# Patient Record
Sex: FEMALE | Race: WHITE
[De-identification: names, ages, dates, MRNs, and addresses within clinical notes are randomized per-mention and may not be internally consistent; named-entity substitution may affect disease eponyms.]

---

## 2017-03-22 NOTE — RAD
PROCEDURE 

CT head without contrast. 

 

HISTORY 

Increasing right-sided weakness for 2 weeks. 

 

TECHNIQUE 

Noncontrast CT head was obtained. 

One or more of the following individualized dose reduction techniques were

utilized for this exam: 1. Automated exposure control. 2. Adjustment of 

the mA and/or kV according to patient's size. 3. Use of iterative 

reconstruction technique. 

 

COMPARISON 

January 12, 2017. 

 

FINDINGS 

Large stable infarct involving the left frontal lobe and left basal 

ganglia and left insula is again noted. This also likely extends into the 

thalamus. It has similar appearance to prior. No new infarct is 

identified. There is no acute intracranial hemorrhage or extra-axial fluid

collection. There is brain parenchymal volume loss with ex vacuo dilation 

of the left lateral ventricle. There is no mass effect or midline shift. 

There are vascular calcifications. The paranasal sinuses and mastoid air 

cells are clear. 

 

IMPRESSION 

Stable large infarct mainly involving the frontal lobe and basal ganglia. 

No evidence of an acute infarct. 

 

Electronically signed by: Jose Christine MD (Mar 22, 2017 18:41:45)

## 2017-03-23 NOTE — RAD
Indication headaches. Right arm pain. Possible CVA. Protocol study.



PA and lateral views of the chest were obtained. Comparison is made to a study

January 10, 2017.



Postoperative changes are noted as well as a bipolar cardiac pacing device.

There is no congestive heart failure. A consolidated pneumonia is not seen.

There is slight blunting of the left costophrenic angle likely reflecting

scar. Significant pleural fluid in either lung is not seen.



IMPRESSION: No acute or focal process seen in the chest

## 2017-03-23 NOTE — EKG
[facility]

               [address] [place] [address]

Test Date:    2017               Test Time:    07:42:40

Pat Name:     RICARDA HOPE         Department:   

Patient ID:   [id number]           Room:         648 1

Gender:       F                        Technician:   LILY

:          1950               Requested By: GARTH GONSALEZ

Order Number: [id number]            Reading MD:     

                                 Measurements

Intervals                              Axis          

Rate:         61                       P:            0

KY:           216                      QRS:          45

QRSD:         74                       T:            21

QT:           396                                    

QTc:          400                                    

                           Interpretive Statements

SINUS RHYTHM

NO SPECIFIC ECG ABNORMALITIES

RI6.01

Compared to ECG 2012 03:30:53

No significant changes

## 2017-03-23 NOTE — PDOC2
CONSULT


Date of Consult


Date of Consult


DATE: 3/23/17 


TIME: 17:06





Reason for Consult


Reason for Consult:


Right carotid stenosis, asymptomatic with left internal carotid occlusion.





Referring Physician


Referring Physician:


Dr. Mejia





Identification/Chief Complaint


Chief Complaint


Right arm weakness





Source


Source:  Patient





History of Present Illness


Reason for Visit:





Ms. Castillo is a 66 y/o female with HTN, HLD, CAD s/p CABG, Pacemaker s/p 

right-sided (with residual RUE weakness) stroke 6 years ago w/ L ICA occlusion 

that was admitted to [facility] for evaluation of right arm weakness.  She states that 

the right arm has been weaker over the past several weeks, but she did have 

residual weakness after of the arm after the stroke.  She denies any chest 

pain. She denies any claudication, rest pain, or ulcers of the legs.





Past Medical History


Cardiovascular:  CAD, Hyperlipidemia


Pulmonary:  COPD


CENTRAL NERVOUS SYSTEM:  CVA


GI:  GERD


Heme/Onc:  Other (hereditary spherocytosis)





Past Surgical History


Past Surgical History:  Pacemaker, CABG, Other (splenectomy for the 

spherocytosis, coronary stent)





Current Problem List


Problem List


 Problems


Medical Problems:


(1) Carotid artery occlusion


Status: Acute  











Current Medications


Current Medications





 Current Medications


Acetaminophen (Tylenol) 650 mg 1X  ONCE PO  Last administered on 3/22/17at 18:02

;  Start 3/22/17 at 17:45;  Stop 3/22/17 at 17:49;  Status DC


Ibuprofen (Motrin) 600 mg 1X  ONCE PO  Last administered on 3/22/17at 18:02;  

Start 3/22/17 at 17:45;  Stop 3/22/17 at 17:49;  Status DC


Metoprolol Tartrate (Lopressor) 50 mg TID PO  Last administered on 3/23/17at 13:

32;  Start 3/22/17 at 21:00


Simvastatin (Zocor) 40 mg QHS PO ;  Start 3/22/17 at 21:00


Lisinopril (Prinivil) 20 mg DAILY PO  Last administered on 3/23/17at 09:04;  

Start 3/23/17 at 09:00


Tizanidine HCl (Zanaflex) 4 mg QHS PO ;  Start 3/22/17 at 21:00;  Stop 3/22/17 

at 21:15;  Status DC


Hydrochlorothiazide (Microzide) 12.5 mg DAILY PO  Last administered on 3/23/

17at 09:05;  Start 3/23/17 at 09:00


Aspirin (Children'S Aspirin) 81 mg DAILYWBKFT PO ;  Start 3/23/17 at 08:00;  

Stop 3/23/17 at 08:59;  Status DC


Acetaminophen (Tylenol) 650 mg PRN Q6HRS  PRN PO MILD PAIN / TEMP Last 

administered on 3/23/17at 11:14;  Start 3/22/17 at 20:45


Ibuprofen (Motrin) 400 mg PRN Q6HRS  PRN PO INFLAMMATION;  Start 3/22/17 at 20:

45


Tizanidine HCl (Zanaflex) 2 mg QHS PO ;  Start 3/22/17 at 21:15


Aspirin (Children'S Aspirin) 324 mg DAILYWBKFT PO  Last administered on 3/23/

17at 09:04;  Start 3/23/17 at 09:00


Fluoxetine HCl (Prozac) 20 mg DAILY PO  Last administered on 3/23/17at 13:32;  

Start 3/23/17 at 12:00





Active Scripts


Active


Simvastatin 40 Mg Tablet 1 Tab PO QHS


Tizanidine Hcl 4 Mg Tablet 0.5 Tab PO QHS


Reported


Lisinopril-Hctz 20-12.5 Mg Tab (Lisinopril/Hydrochlorothiazide) 1 Each Tablet 1 

Tab PO DAILY


Metoprolol Tartrate 50 Mg Tablet 50 Mg PO TID





Allergies


Allergies:  


Coded Allergies:  


     Penicillins (Verified  Allergy, Intermediate, 1/10/17)


     Sulfa (Sulfonamide Antibiotics) (Verified  Allergy, Intermediate, 1/10/17)


     codeine (Verified  Allergy, Intermediate, 1/10/17)


     erythromycin base (Verified  Allergy, Unknown, 3/22/17)





Physical Exam


General:  Alert, Oriented X3


HEENT:  Atraumatic, PERRLA


Heart:  Regular rate, Normal S1, Normal S2, Other (palpable radial b/l, 

palpable femoral b/l, palpable popliteal b/l, 1+ pulse DP/PT b/l)


Abdomen:  Normal bowel sounds, Soft, No tenderness


Extremities:  No cyanosis, No edema


Skin:  No rashes, No breakdown


Neuro:  Normal gait, Sensation intact, Other (RUE 4/5, LUE 5/5, lower 

extremities 5/5 on strength)


Psych/Mental Status:  Mental status NL, Mood NL


MUSCULOSKELETAL:  No joint tenderness, No swelling





Vitals


VITALS





 Vital Signs








  Date Time  Temp Pulse Resp B/P Pulse Ox O2 Delivery O2 Flow Rate FiO2


 


3/23/17 15:00 97.7 60 17 100/59 95 Room Air  





 97.7       











Labs


Labs





Laboratory Tests








Test


  3/22/17


21:25 3/23/17


04:25


 


Sodium Level


  140mmol/L


(136-145) 


 


 


Potassium Level


  3.7mmol/L


(3.5-5.1) 


 


 


Chloride Level


  105mmol/L


() 


 


 


Carbon Dioxide Level


  26mmol/L


(21-32) 


 


 


Anion Gap 9 (6-14)  


 


Blood Urea Nitrogen 35mg/dL (7-20)  


 


Creatinine


  2.1mg/dL


(0.6-1.0) 


 


 


Estimated GFR


(Cockcroft-Gault) 23.4 


  


 


 


BUN/Creatinine Ratio 17 (6-20)  


 


Glucose Level


  93mg/dL


(70-99) 


 


 


Calcium Level


  8.8mg/dL


(8.5-10.1) 


 


 


Total Bilirubin


  0.5mg/dL


(0.2-1.0) 


 


 


Aspartate Amino Transf


(AST/SGOT) 14U/L (15-37) 


  


 


 


Alanine Aminotransferase


(ALT/SGPT) 16U/L (14-59) 


  


 


 


Alkaline Phosphatase 51U/L ()  


 


Total Protein


  6.3g/dL


(6.4-8.2) 


 


 


Albumin


  3.3g/dL


(3.4-5.0) 


 


 


Albumin/Globulin Ratio 1.1 (1.0-1.7)  


 


White Blood Count


  


  11.0x10^3/uL


(4.0-11.0)


 


Red Blood Count


  


  3.94x10^6/uL


(3.50-5.40)


 


Hemoglobin


  


  13.4g/dL


(12.0-15.5)


 


Hematocrit


  


  36.6%


(36.0-47.0)


 


Mean Corpuscular Volume  93fL () 


 


Mean Corpuscular Hemoglobin  34pg (25-35) 


 


Mean Corpuscular Hemoglobin


Concent 


  37g/dL (31-37) 


 


 


Red Cell Distribution Width


  


  14.5%


(11.5-14.5)


 


Platelet Count


  


  216x10^3/uL


(140-400)


 


Neutrophils (%) (Auto)  22% (31-73) 


 


Lymphocytes (%) (Auto)  60% (24-48) 


 


Monocytes (%) (Auto)  8% (0-9) 


 


Eosinophils (%) (Auto)  9% (0-3) 


 


Basophils (%) (Auto)  1% (0-3) 


 


Neutrophils # (Auto)


  


  2.4x10^3uL


(1.8-7.7)


 


Lymphocytes # (Auto)


  


  6.5x10^3/uL


(1.0-4.8)


 


Monocytes # (Auto)


  


  0.9x10^3/uL


(0.0-1.1)


 


Eosinophils # (Auto)


  


  1.0x10^3/uL


(0.0-0.7)


 


Basophils # (Auto)


  


  0.1x10^3/uL


(0.0-0.2)


 


Segmented Neutrophils %  26% (35-66) 


 


Band Neutrophils %  1% (0-9) 


 


Lymphocytes %  51% (24-48) 


 


Atypical Lymphocytes %


(Manual) 


  6% (0-0) 


 


 


Monocytes %  7% (0-10) 


 


Eosinophils %  8% (0-5) 


 


Basophils %  1% (0-3) 


 


Platelet Estimate


  


  Adequate


(ADEQUATE)


 


Erythrocyte Sedimentation Rate  10 (0-25) 


 


Triglycerides Level


  


  236mg/dL


(0-150)


 


Cholesterol Level


  


  265mg/dL


(0-200)


 


LDL Cholesterol, Calculated


  


  183mg/dL


(0-100)


 


VLDL Cholesterol, Calculated  47mg/dL (0-40) 


 


HDL Cholesterol


  


  35mg/dL


(40-60)


 


Cholesterol/HDL Ratio  7.6 


 


Thyroid Stimulating Hormone


(TSH) 


  1.915uIU/mL


(0.358-3.74)








Laboratory Tests








Test


  3/22/17


21:25 3/23/17


04:25


 


Sodium Level


  140mmol/L


(136-145) 


 


 


Potassium Level


  3.7mmol/L


(3.5-5.1) 


 


 


Chloride Level


  105mmol/L


() 


 


 


Carbon Dioxide Level


  26mmol/L


(21-32) 


 


 


Anion Gap 9 (6-14)  


 


Blood Urea Nitrogen 35mg/dL (7-20)  


 


Creatinine


  2.1mg/dL


(0.6-1.0) 


 


 


Estimated GFR


(Cockcroft-Gault) 23.4 


  


 


 


BUN/Creatinine Ratio 17 (6-20)  


 


Glucose Level


  93mg/dL


(70-99) 


 


 


Calcium Level


  8.8mg/dL


(8.5-10.1) 


 


 


Total Bilirubin


  0.5mg/dL


(0.2-1.0) 


 


 


Aspartate Amino Transf


(AST/SGOT) 14U/L (15-37) 


  


 


 


Alanine Aminotransferase


(ALT/SGPT) 16U/L (14-59) 


  


 


 


Alkaline Phosphatase 51U/L ()  


 


Total Protein


  6.3g/dL


(6.4-8.2) 


 


 


Albumin


  3.3g/dL


(3.4-5.0) 


 


 


Albumin/Globulin Ratio 1.1 (1.0-1.7)  


 


White Blood Count


  


  11.0x10^3/uL


(4.0-11.0)


 


Red Blood Count


  


  3.94x10^6/uL


(3.50-5.40)


 


Hemoglobin


  


  13.4g/dL


(12.0-15.5)


 


Hematocrit


  


  36.6%


(36.0-47.0)


 


Mean Corpuscular Volume  93fL () 


 


Mean Corpuscular Hemoglobin  34pg (25-35) 


 


Mean Corpuscular Hemoglobin


Concent 


  37g/dL (31-37) 


 


 


Red Cell Distribution Width


  


  14.5%


(11.5-14.5)


 


Platelet Count


  


  216x10^3/uL


(140-400)


 


Neutrophils (%) (Auto)  22% (31-73) 


 


Lymphocytes (%) (Auto)  60% (24-48) 


 


Monocytes (%) (Auto)  8% (0-9) 


 


Eosinophils (%) (Auto)  9% (0-3) 


 


Basophils (%) (Auto)  1% (0-3) 


 


Neutrophils # (Auto)


  


  2.4x10^3uL


(1.8-7.7)


 


Lymphocytes # (Auto)


  


  6.5x10^3/uL


(1.0-4.8)


 


Monocytes # (Auto)


  


  0.9x10^3/uL


(0.0-1.1)


 


Eosinophils # (Auto)


  


  1.0x10^3/uL


(0.0-0.7)


 


Basophils # (Auto)


  


  0.1x10^3/uL


(0.0-0.2)


 


Segmented Neutrophils %  26% (35-66) 


 


Band Neutrophils %  1% (0-9) 


 


Lymphocytes %  51% (24-48) 


 


Atypical Lymphocytes %


(Manual) 


  6% (0-0) 


 


 


Monocytes %  7% (0-10) 


 


Eosinophils %  8% (0-5) 


 


Basophils %  1% (0-3) 


 


Platelet Estimate


  


  Adequate


(ADEQUATE)


 


Erythrocyte Sedimentation Rate  10 (0-25) 


 


Triglycerides Level


  


  236mg/dL


(0-150)


 


Cholesterol Level


  


  265mg/dL


(0-200)


 


LDL Cholesterol, Calculated


  


  183mg/dL


(0-100)


 


VLDL Cholesterol, Calculated  47mg/dL (0-40) 


 


HDL Cholesterol


  


  35mg/dL


(40-60)


 


Cholesterol/HDL Ratio  7.6 


 


Thyroid Stimulating Hormone


(TSH) 


  1.915uIU/mL


(0.358-3.74)











Images


Images


Carotid US 3/23/17


IMPRESSION: Moderately extensive intimal thickening and plaquing involving


both carotid bifurcations. No flow seen in the left internal carotid, just


after the bifurcation, similar to the previous exam suggesting occlusion.


Hemodynamically significant stenosis involving the right internal carotid 

estimated at approximately 70%.





CT Head, noncontrast 3/23/17





IMPRESSION 


Stable large infarct mainly involving the frontal lobe and basal ganglia. 


No evidence of an acute infarct.





Assessment/Plan


Assessment/Plan


Impression:





1. Prior right stroke in 2011 w/ residual right-sided weakness and occluded L 

ICA


2. Asymptomatic R ICA stenosis, ~ 70%


2. HTN


3. headaches


4. Dyslipidemia


5. CKD, creat 2.01





Plan:


The carotid ultrasound was reviewed, and progression from a previous carotid 

ultrasound from 1/2017 with progression of R ICA stenosis from 50-69% to > 70% 

with no changes in the occluded L ICA.  She does not have any stroke symptoms, 

no left-sided weakness, visual changes, or speech changes.  She does have right-

sided weakness, but stable left old infarct.  Although the velocity is elevated 

240/63 the end diastolic is < 100 and her ICA/CCA ratio is 2.0 which is 

consistent with a stenosis of 70%.  Especially in the setting of a contra-

lateral occluded ICA, the PSV of vinicius R ICA may be higher due to compensation of 

the occluded L ICA.  A CTA of the neck would help clarify the degree of stenosis

, however, would not recommend a CTA of [facility] neck with her creatinine at 2.01.  

Recommend repeat carotid US in 6 months with follow-up with Vascular Surgery, 

and continue medical therapy with aspirin, lipitor, and BP control.  Thank you 

for the consult.








HAWA MCKEON MD Mar 23, 2017 17:29

## 2017-03-23 NOTE — HP
ADMIT DATE:  03/22/2017



HISTORY OF PRESENT ILLNESS:  This is a 67-year-old white female who came into

the office complaining of weakness of the right arm.



She has sustained a stroke in the right arm.  She has had difficulty with her

right arm, but she believes she was able to do more.  She was able to  an

object.  She is now not able to  an object.  When I asked her to 

a pen, her whole hand spread across the pen and she could not bend her fingers

to pick it up.  She does not feed herself with her right arm.  She feeds herself

with her left arm.  She continues to drive with her left hand.  She signs with

her left hand.  Thus, she does not use her right arm for any useful purpose, 

 Since the stroke, she thinks her right arm is much weaker than

before.



She also has had dysarthria.  She feels it is a little worse.  I could not

discern that.



She has had weakness of her right leg, but  she has always been

able to walk normal.  She does not use any assistive device after her rehab in

[place] after her stroke in 2011.  She even was working at [employer].  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

things right and also some desk work.



She is really concerned about this increased weakness in the right arm.  She

was concerned that she has had another stroke and is very worried about it.



She lives alone at home.  Her family lives in [place].  She had been to 
[place]

for a few days in January.  Since then, she has come home and lives alone and

does all her grocery shopping by herself.  She does not work anymore.  She

drives to various places on her own.  She drove up to the office.  She did not

think there was anything unusual with that because she uses her left arm.



PAST MEDICAL HISTORY:  Very significant.



In 1992, she underwent a splenectomy for hereditary spherocytosis.  Her mother

had also had a splenectomy.



In 2002, she sustained an acute inferior wall myocardial infarction and had a

stent placed.  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



Thereafter, she underwent open heart surgery.  I do not believe she went back to

work after the OHS  She was well enough to do so, but since she continued to 
smoke

at that time, the [employer] wanted her to have her evaluated to be able to work

under the cabin pressures whilein flight.  She underwent ABG.  The pO2 was 79 
at sea

level.  [employer] believed that if she had a pO2 of 79 at sea level, it

would probably drop to a pO2 of 50 at the cabin pressure while in flight.  She

thus could not go back to work.



 I saw her in the office on 03/02/2011.  She complained of having episodes

of dysphasia.  I wanted her to have carotid Dopplers.  She did not have

insurance at that time and she did not want to spend money doing it. 

Unfortunately, on 03/24/2011, she developed right-sided stroke.  This occurred

at night.  She was living with her niece.  She woke up and, wandered

out.  Her niece found her and found that she had had a stroke.  The ambulance

was called and she was taken to [facility].  There, they found that

there was a  occlusion of the middle cerebral artery.  There was also a

total occlusion of the left internal carotid artery.  An experimental laser

treatment was carried out.  This is because she was out of the window for

thrombolysis.  It had no affect and she continued to have the deficits as she

had.  She then went to rehabilitation.  The deficits were severe dysarthria, and

her dysarthria improved with speech therapy.  Severe weakness of the right hand,

which did not improve much.  Weakness of the right leg, which improved

significantly to the point that she was able to walk with no assistance.



She then saw a  neurologist, Dr. Inder Hollingsworth in 03/2011 about 3 months after

the stroke.  At that time, she had a seizure.  She felt that her right arm had

become weaker yet.  Dr. Hollingsworth placed her on Plavix and I do not know when she

discontinued it.  He also placed her on Keppra, but then when he saw her in June
,

he took her off the Keppra.  I believe she had one other visit with him in

05/2011 for again increased weakness of the right arm and Dr. Hollingsworth at that time

thought it could be due to depression and talked to her about antidepressants,

but there is no record that she was placed on one.



She did see me in 2012 for chest pain.  A cardiac catheterization was performed

on 09/26/2012.  The LIMA to the LAD was patent.  The LIMA was a very small

vessel.  When it was engaged, the LIMA was totally occluded and there was no

flow and she went into V-fib and was shocked out of it.  The SVG to the diagonal

branch was patent.  SVG to the right coronary artery and the posterior

descending branch was patent.  At that time, she underwent carotid Doppler

  The left internal carotid artery was totally occluded.  The

right internal carotid artery had a 50-70% obstruction.  She was seen in

consultation by Dr. Eaton who felt at that time that the risks of doing the

surgery outweighed the benefits, but that was back in 2012.



She had a hospitalization in 01/2017.  The right internal carotid artery again

showed a 50-69% obstruction.  She was  seen by Dr. Alfred for headaches.

 He placed her on tizanidine.  I am not sure whether the patient is still taking

it.  She said that she had tried to get in touch with Dr. Alfred but dis not 
have his number.

She did not call the office either.





Her last myocardial perfusion study was done in 01/2017 when there was a mixed

perfusion defect in the lateral wall.  She had no symptoms at that time and we

did not subject her to further investigations.



She had stopped taking her statins.  Her HDL cholesterol has been high all

along.  Unfortunately, she had no insurance, but since she turned 64, she does

have Medicare.  In 01/2017, her LDL cholesterol was elevated to 158, which was

still better than 200, which was some years ago.



She continues to smoke at the present time.



MEDICATIONS:  Her present medications are listed as:

1.  Aspirin 81 mg a day.

2.  Metoprolol tartrate 50 mg 3 times a day.

3.  Lisinopril/hydrochlorothiazide 20/12.5 once a day.

4.  Pantoprazole 20 mg a day.

5.  Ibuprofen 200 mg 4 times a day for headaches.

6.  Lipitor 20 mg a day.

7.  She was not at all sure whether she is taking the tizanidine or not.



PAST MEDICAL HISTORY:

1.  Splenectomy.

2.  Open heart surgery.

3.  Permanent pacemaker.  The pacemaker has been checked recently.



FAMILY HISTORY:  Her mother had coronary artery disease.  She also had had

hereditary spherocytosis and had had splenectomy done.  She  had diabetes.



PHYSICAL EXAMINATION:

GENERAL:  She was alert, awake.  Her speech was very dysarthric.  She had

difficulty in getting certain words out, but could easily recognize the right

word that she wanted to say.  She was very slow.  I did not see a difference in

her speech as I have seen her before.

VITAL SIGNS:  She was afebrile.  The blood pressure was 124/88, oxygen

saturation was 97% on room air.  The weight was 142 pounds, which has been

stable.

LUNGS:  Clear.

HEART:  The heart sounds are normal with no murmur or gallop.

ABDOMEN:  Soft.

EXTREMITIES:  There is no edema of the legs.

NEUROLOGIC:  She had a right facial droop.

She had weakness of the right arm and right elbow.  She did not have a . 

Her hand would simply spread out and she would be unable to bend her fingers and

 an object.

There was decreased strength in the right leg,   She would be

able to get up and walk with a normal gait.



IMPRESSION AND PLAN:

1.  Right-sided cerebrovascular accident in 2011 with the patient stating that

she has had further weakness of her right arm and is concerned that she might

have had another stroke.  Plavix that was initiated by a neurologist in 2011 has

not been continued by her.

2.  Severe headaches,  for the last 1 year, but she says it has been worse

in the last 2 weeks.

3.  Hypertension, under control.

4.  Dyslipidemia.

5.  Coronary artery disease with no symptoms of chest pain with limited

activity.

6.  Dyslipidemia.

7.  Hereditary spherocytosis status post splenectomy.

8.  Carotid vascular disease with total occlusion of the left internal carotid

artery and 50-70% obstruction of the right internal carotid artery, no symptoms

on the left side.

9.Chronic kidney disease



This patient lives alone and provides her own transportation.  She has nobody

to drive her around.  She came in with 4 days of increased weakness of the right

arm and 2 weeks of headaches.  She said she was unable to contact her

neurologist.  She needs a CT scan and consultation with a neurologist.  She also

needs to have her right carotid artery followed for the moderate obstruction of

50-70% since that is the only artery that is patent and the left internal

carotid artery totally occluded.  She was thus being hospitalized for immediate

attention in this patient who is noncompliant and has limited access to care.  
Also, I

would like Dr. Alfred's opinion as to whether she should go back on the

Plavix.  I am not sure of her financial situation at this time.  Dr. Hollingsworth, her 
previous

neurologist, had recommended that she be placed on Plavix and somewhere along

the line, she has discontinued it.  Also, I would like Dr. Alfred's advice

regarding treatment of her headache.  She already has renal dysfunction with a

creatinine of 1.67 when checked last and continued use of high dose of ibuprofen

would only worsen her kidney failure.

 



______________________________

GARTH GONSALEZ MD



DR:  ANATOLY/yany  JOB#:  [id number] / [id number]

DD:  03/23/2017 00:15  DT:  03/23/2017 02:00

ANTWON

## 2017-03-23 NOTE — PDOC2
NEUROLOGY CONSULT


Date of Admission


Date of Admission


DATE: 3/23/17 


TIME: 11:45





Reason for Consult


Reason for Consult:


Increased right arm weakness following stroke





Referring Physician


Referring Physician:


Dr. Cook





Source


Source:  Chart review, Patient





History of Present Illness


History of Present Illness


The patient is a 67-year-old right-handed female who had a stroke 6 years ago 

treated at [facility]. She had a aphasia and right hip repair assist 

but made a very good recovery, even able to work, drive, and take care of 

things on her own, living alone. Over the last few weeks she has noticed 

increased right arm weakness. Dr. Cook decided that she should be 

admitted. The patient does have a history of seizure, but none in the past 5 

years. There is no history of head injury. She was diagnosed with left carotid 

occlusion at the time of her stroke. I just saw her 2 months ago for headaches 

at which time CT scan of the head, carotid Dopplers, and sedimentation rate 

were negative. I started her on ties and a need for tension headaches but she 

does not recall if it helped. She denies headaches today.





Past Medical History


Cardiovascular:  CAD, Hyperlipidemia


Heme/Onc:  Other (hereditary spherocytosis)





Past Surgical History


Past Surgical History:  Pacemaker, CABG, Other (splenectomy for the 

spherocytosis, coronary stent)





Family History


Family History:  Other (hereditary spherocytosis)





Social History


Social History


Single, smokes one fourth pack of cigarettes per day, rare alcohol, lives on 

her own





Current Medications


Current Medications





 Current Medications


Acetaminophen (Tylenol) 650 mg 1X  ONCE PO  Last administered on 3/22/17at 18:02

;  Start 3/22/17 at 17:45;  Stop 3/22/17 at 17:49;  Status DC


Ibuprofen (Motrin) 600 mg 1X  ONCE PO  Last administered on 3/22/17at 18:02;  

Start 3/22/17 at 17:45;  Stop 3/22/17 at 17:49;  Status DC


Metoprolol Tartrate (Lopressor) 50 mg TID PO  Last administered on 3/23/17at 09:

05;  Start 3/22/17 at 21:00


Simvastatin (Zocor) 40 mg QHS PO ;  Start 3/22/17 at 21:00


Lisinopril (Prinivil) 20 mg DAILY PO  Last administered on 3/23/17at 09:04;  

Start 3/23/17 at 09:00


Tizanidine HCl (Zanaflex) 4 mg QHS PO ;  Start 3/22/17 at 21:00;  Stop 3/22/17 

at 21:15;  Status DC


Hydrochlorothiazide (Microzide) 12.5 mg DAILY PO  Last administered on 3/23/

17at 09:05;  Start 3/23/17 at 09:00


Aspirin (Children'S Aspirin) 81 mg DAILYWBKFT PO ;  Start 3/23/17 at 08:00;  

Stop 3/23/17 at 08:59;  Status DC


Acetaminophen (Tylenol) 650 mg PRN Q6HRS  PRN PO MILD PAIN / TEMP Last 

administered on 3/23/17at 11:14;  Start 3/22/17 at 20:45


Ibuprofen (Motrin) 400 mg PRN Q6HRS  PRN PO INFLAMMATION;  Start 3/22/17 at 20:

45


Tizanidine HCl (Zanaflex) 2 mg QHS PO ;  Start 3/22/17 at 21:15


Aspirin (Children'S Aspirin) 324 mg DAILYWBKFT PO  Last administered on 3/23/

17at 09:04;  Start 3/23/17 at 09:00





Active Scripts


Active


Simvastatin 40 Mg Tablet 1 Tab PO QHS


Tizanidine Hcl 4 Mg Tablet 0.5 Tab PO QHS


Reported


Lisinopril-Hctz 20-12.5 Mg Tab (Lisinopril/Hydrochlorothiazide) 1 Each Tablet 1 

Tab PO DAILY


Metoprolol Tartrate 50 Mg Tablet 50 Mg PO TID





Allergies


Allergies:  


Coded Allergies:  


     Penicillins (Verified  Allergy, Intermediate, 1/10/17)


     Sulfa (Sulfonamide Antibiotics) (Verified  Allergy, Intermediate, 1/10/17)


     codeine (Verified  Allergy, Intermediate, 1/10/17)


     erythromycin base (Verified  Allergy, Unknown, 3/22/17)





ROS


Review of System


Patient denies fevers, chills, weight loss, dyspnea, angina, abdominal pain, 

change in bowels, or dysuria. 14 point review of systems is negative.





Physical Exam


Physical Examination


PHYSICAL EXAMINATION:  


Vital signs: see above.  


General appearance is normal and in no acute distress.  


HEENT:  Normocephalic and nontraumatic.  Eyes, nose, ears, and throat are 

unremarkable.  


Neck is supple. No lymphadenopathy. No bruits are heard over the carotid 

artery.  No crepitus. 


NEUROLOGIC:


Mental Status Examination:  Alert. Oriented to time, place, and person. Mild 

expressive aphasia. Pupils are equal round and reactive to light and 

accommodation.  Funduscopic exam:  No papilledema.  Extraocular movements are 

intact.   Visual field exam shows no defect on the direct confrontation.  Mild 

right central facial weakness.  Uvula in the midline and the soft palate 

elevated symmetrically.  No deviation of the tongue to any direction.  Gross 

hearing is normal.  Shoulder shrug normal.  Muscle tone is normal.  Muscle 

strength is 4/5 on the right, 5/5 on the left.  Deep tendon reflexes are 2+ all 

around.  Plantar reflex is with flexion response bilaterally.  Finger-to-nose 

test performance is accurate.  Alternative movements are accurate.  Gait not 

tested.  Sensory exam shows no deficits. No cerebellar signs are elicited.





Vitals


VITALS





 Vital Signs








  Date Time  Temp Pulse Resp B/P Pulse Ox O2 Delivery O2 Flow Rate FiO2


 


3/23/17 10:52 97.7 62 17 108/63 96 Room Air  





 97.7       











Labs


Labs





Laboratory Tests








Test


  3/22/17


21:25 3/23/17


04:25


 


Sodium Level


  140mmol/L


(136-145) 


 


 


Potassium Level


  3.7mmol/L


(3.5-5.1) 


 


 


Chloride Level


  105mmol/L


() 


 


 


Carbon Dioxide Level


  26mmol/L


(21-32) 


 


 


Anion Gap 9 (6-14)  


 


Blood Urea Nitrogen 35mg/dL (7-20)  


 


Creatinine


  2.1mg/dL


(0.6-1.0) 


 


 


Estimated GFR


(Cockcroft-Gault) 23.4 


  


 


 


BUN/Creatinine Ratio 17 (6-20)  


 


Glucose Level


  93mg/dL


(70-99) 


 


 


Calcium Level


  8.8mg/dL


(8.5-10.1) 


 


 


Total Bilirubin


  0.5mg/dL


(0.2-1.0) 


 


 


Aspartate Amino Transf


(AST/SGOT) 14U/L (15-37) 


  


 


 


Alanine Aminotransferase


(ALT/SGPT) 16U/L (14-59) 


  


 


 


Alkaline Phosphatase 51U/L ()  


 


Total Protein


  6.3g/dL


(6.4-8.2) 


 


 


Albumin


  3.3g/dL


(3.4-5.0) 


 


 


Albumin/Globulin Ratio 1.1 (1.0-1.7)  


 


White Blood Count


  


  11.0x10^3/uL


(4.0-11.0)


 


Red Blood Count


  


  3.94x10^6/uL


(3.50-5.40)


 


Hemoglobin


  


  13.4g/dL


(12.0-15.5)


 


Hematocrit


  


  36.6%


(36.0-47.0)


 


Mean Corpuscular Volume  93fL () 


 


Mean Corpuscular Hemoglobin  34pg (25-35) 


 


Mean Corpuscular Hemoglobin


Concent 


  37g/dL (31-37) 


 


 


Red Cell Distribution Width


  


  14.5%


(11.5-14.5)


 


Platelet Count


  


  216x10^3/uL


(140-400)


 


Neutrophils (%) (Auto)  22% (31-73) 


 


Lymphocytes (%) (Auto)  60% (24-48) 


 


Monocytes (%) (Auto)  8% (0-9) 


 


Eosinophils (%) (Auto)  9% (0-3) 


 


Basophils (%) (Auto)  1% (0-3) 


 


Neutrophils # (Auto)


  


  2.4x10^3uL


(1.8-7.7)


 


Lymphocytes # (Auto)


  


  6.5x10^3/uL


(1.0-4.8)


 


Monocytes # (Auto)


  


  0.9x10^3/uL


(0.0-1.1)


 


Eosinophils # (Auto)


  


  1.0x10^3/uL


(0.0-0.7)


 


Basophils # (Auto)


  


  0.1x10^3/uL


(0.0-0.2)


 


Segmented Neutrophils %  26% (35-66) 


 


Band Neutrophils %  1% (0-9) 


 


Lymphocytes %  51% (24-48) 


 


Atypical Lymphocytes %


(Manual) 


  6% (0-0) 


 


 


Monocytes %  7% (0-10) 


 


Eosinophils %  8% (0-5) 


 


Basophils %  1% (0-3) 


 


Platelet Estimate


  


  Adequate


(ADEQUATE)


 


Erythrocyte Sedimentation Rate  10 (0-25) 


 


Triglycerides Level


  


  236mg/dL


(0-150)


 


Cholesterol Level


  


  265mg/dL


(0-200)


 


LDL Cholesterol, Calculated


  


  183mg/dL


(0-100)


 


VLDL Cholesterol, Calculated  47mg/dL (0-40) 


 


HDL Cholesterol


  


  35mg/dL


(40-60)


 


Cholesterol/HDL Ratio  7.6 


 


Thyroid Stimulating Hormone


(TSH) 


  1.915uIU/mL


(0.358-3.74)








Laboratory Tests








Test


  3/22/17


21:25 3/23/17


04:25


 


Sodium Level


  140mmol/L


(136-145) 


 


 


Potassium Level


  3.7mmol/L


(3.5-5.1) 


 


 


Chloride Level


  105mmol/L


() 


 


 


Carbon Dioxide Level


  26mmol/L


(21-32) 


 


 


Anion Gap 9 (6-14)  


 


Blood Urea Nitrogen 35mg/dL (7-20)  


 


Creatinine


  2.1mg/dL


(0.6-1.0) 


 


 


Estimated GFR


(Cockcroft-Gault) 23.4 


  


 


 


BUN/Creatinine Ratio 17 (6-20)  


 


Glucose Level


  93mg/dL


(70-99) 


 


 


Calcium Level


  8.8mg/dL


(8.5-10.1) 


 


 


Total Bilirubin


  0.5mg/dL


(0.2-1.0) 


 


 


Aspartate Amino Transf


(AST/SGOT) 14U/L (15-37) 


  


 


 


Alanine Aminotransferase


(ALT/SGPT) 16U/L (14-59) 


  


 


 


Alkaline Phosphatase 51U/L ()  


 


Total Protein


  6.3g/dL


(6.4-8.2) 


 


 


Albumin


  3.3g/dL


(3.4-5.0) 


 


 


Albumin/Globulin Ratio 1.1 (1.0-1.7)  


 


White Blood Count


  


  11.0x10^3/uL


(4.0-11.0)


 


Red Blood Count


  


  3.94x10^6/uL


(3.50-5.40)


 


Hemoglobin


  


  13.4g/dL


(12.0-15.5)


 


Hematocrit


  


  36.6%


(36.0-47.0)


 


Mean Corpuscular Volume  93fL () 


 


Mean Corpuscular Hemoglobin  34pg (25-35) 


 


Mean Corpuscular Hemoglobin


Concent 


  37g/dL (31-37) 


 


 


Red Cell Distribution Width


  


  14.5%


(11.5-14.5)


 


Platelet Count


  


  216x10^3/uL


(140-400)


 


Neutrophils (%) (Auto)  22% (31-73) 


 


Lymphocytes (%) (Auto)  60% (24-48) 


 


Monocytes (%) (Auto)  8% (0-9) 


 


Eosinophils (%) (Auto)  9% (0-3) 


 


Basophils (%) (Auto)  1% (0-3) 


 


Neutrophils # (Auto)


  


  2.4x10^3uL


(1.8-7.7)


 


Lymphocytes # (Auto)


  


  6.5x10^3/uL


(1.0-4.8)


 


Monocytes # (Auto)


  


  0.9x10^3/uL


(0.0-1.1)


 


Eosinophils # (Auto)


  


  1.0x10^3/uL


(0.0-0.7)


 


Basophils # (Auto)


  


  0.1x10^3/uL


(0.0-0.2)


 


Segmented Neutrophils %  26% (35-66) 


 


Band Neutrophils %  1% (0-9) 


 


Lymphocytes %  51% (24-48) 


 


Atypical Lymphocytes %


(Manual) 


  6% (0-0) 


 


 


Monocytes %  7% (0-10) 


 


Eosinophils %  8% (0-5) 


 


Basophils %  1% (0-3) 


 


Platelet Estimate


  


  Adequate


(ADEQUATE)


 


Erythrocyte Sedimentation Rate  10 (0-25) 


 


Triglycerides Level


  


  236mg/dL


(0-150)


 


Cholesterol Level


  


  265mg/dL


(0-200)


 


LDL Cholesterol, Calculated


  


  183mg/dL


(0-100)


 


VLDL Cholesterol, Calculated  47mg/dL (0-40) 


 


HDL Cholesterol


  


  35mg/dL


(40-60)


 


Cholesterol/HDL Ratio  7.6 


 


Thyroid Stimulating Hormone


(TSH) 


  1.915uIU/mL


(0.358-3.74)











Images


Images


Carotids:


Moderately extensive intimal thickening and plaquing involving


both carotid bifurcations.


No flow seen in the left internal carotid, just


after the bifurcation, similar to the previous exam suggesting occlusion.


Hemodynamically significant stenosis involving the


right internal carotid estimated at approximately 70%.





CT head:


Large stable infarct involving the left frontal lobe and left basal 


ganglia and left insula is again noted. This also likely extends into the 


thalamus. It has similar appearance to prior. No new infarct is 


identified. There is no acute intracranial hemorrhage or extra-axial fluid


collection. There is brain parenchymal volume loss with ex vacuo dilation 


of the left lateral ventricle. There is no mass effect or midline shift. 


There are vascular calcifications. The paranasal sinuses and mastoid air 


cells are clear. 


 


IMPRESSION 


Stable large infarct mainly involving the frontal lobe and basal ganglia. 


No evidence of an acute infarct.





Assessment/Plan


Assessment/Plan


Impression:


Right hemiparesis and aphasia following stroke, no evidence of progression 

since the scan of just 2 months ago. I do not detect any difference on exam


Likewise, the patient had carotid Dopplers just 2 months ago repeated again 

this time. 2 months ago the radiologist called 50-69% right carotid stenosis, 

now he is calling 70% stenosis. The left carotid is occluded chronically.


Tension headaches, not a problem today.





Recommendations:


Vascular surgery opinion especially in light of the fact that the left carotid 

is occluded


Rehabilitation modalities


Continue aspirin and statin


Okay for discharge in the next 24 hours from the neurological perspective.


Follow-up with neurology as needed.





Thank you for letting me help with the patient's care.








LAI GUADALUPE MD Mar 23, 2017 11:57

## 2017-03-23 NOTE — RAD
Indication right arm weakness. Speech difficulty. Hypertension.



Grayscale color Doppler and spectral imaging was performed. Examination was

targeted to the carotid bifurcations. Note is made of a previous examination

January 11, 2017.



On the right there is diffuse intimal thickening and some diffuse

calcification. The common carotid waveform and velocities are somewhat

elevated suggesting possible stenosis in the common carotid. There is elevated

peak systolic velocity in the external carotid consistent with incidental

stenosis associated with this vessel. There are elevated peak systolic and

diastolic velocities in the internal carotid. These values are approximately

240 and 63 cm/s respectively. Findings are compatible with stenosis in the 70%

range. The vertebral is patent and demonstrates normal directional flow.



On the left there is also intimal thickening and diffuse plaquing. The common

carotid waveform and velocities are normal. There is an elevated peak systolic

velocity in the external carotid consistent with incidental stenosis in this

vessel. Similar to the previous exam no flow is seen in the internal carotid

just after the bifurcation. The vertebral is patent and demonstrates normal

directional flow.



IMPRESSION: Moderately extensive intimal thickening and plaquing involving

both carotid bifurcations.

                          No flow seen in the left internal carotid, just

after the bifurcation, similar to the previous exam suggesting occlusion.

                          Hemodynamically significant stenosis involving the

right internal carotid estimated at approximately 70%.



Note:  Stenosis calculations for CT, MR and conventional angiography are based

upon determination of the distal ICA diameter in accordance with the NASCET

methodology.  Stenosis calculations for doppler studies are derived from

validated velocity criteria which are known to correlate with NASCET

methodology of determining stenosis.

## 2017-03-24 NOTE — DS
DATE OF DISCHARGE:  03/23/2017



HOSPITAL COURSE:  This is a 67-year-old white female who came into my office

complaining of more weakness and inability to do what she was doing before with

her right arm.  She feared that she may have had another stroke.  Since she had

had such an incident in the past and her neurologist at that time, Dr. Hollingsworth had

placed her on Plavix, which she subsequently discontinued, I thought we should

immediately hospitalize her and have the neurologist here see her, Dr. Mejia

to see what he thinks.  Also, she has had a total occlusion of the left internal

carotid artery.  There has been a 70% obstruction in the left internal carotid

artery and we will have this repeated and a neurosurgeon see her.



She was seen in consultation by Dr. Mejia who was not impressed by the

weakness.  The history she gave him was that she has had the right arm weakness

for the last several weeks.



She had complained to him of headaches in January and he had placed her on

tizanidine.  The patient did not remember taking any tizanidine.



Since the patient could not remember what medications she was taking, we called

her pharmacy.  The pharmacy said that they had filled 4 prescriptions recently.

1.  Pantoprazole.

2.  Lisinopril/hydrochlorothiazide.

3.  Metoprolol succinate.

4.  Tizanidine 4 mg.  She had been given a prescription for atorvastatin in

January, which she evidently did not fill.



Her lab investigations were as follows.  Hemoglobin was 13.4, total WBC count

11,000 with 22% neutrophils and 60% lymphocytes.  There were 8% monocytes, 9%

eosinophils.



In January, her white cell count was 10,100 with 48% neutrophils, 37%

lymphocytes.  Thus, it was not as low.



The patient was very anxious to go home and Dr. Mejia had also said that she

should go home in 24 hours.  She was thus discharged.  She will be asked to see

her hematologist as an outpatient.  Also, this will be repeated when she comes

to the office on 04/07/2017.  Her sed rate was 10.



The sodium is 140, potassium 3.7, BUN is 35, creatinine is 2.1.  The last

creatinine in my chart is 1.65.  She did admit to taking a lot of Motrin, at

least 800 mg a day.  This was not with my permission.  She was told not to take

any more Motrin until rechecked again.



The total cholesterol was 265, the triglycerides were 236, the LDL cholesterol

was 183 and the HDL cholesterol was 35.  The last LDL cholesterol was 165.  As

stated before, she had been given a prescription for atorvastatin that she

failed to fill.  The TSH was 1.9.



The chest x-ray was normal.



The CT scan of the head showed a stable large infarct involving the front lobe

and basal ganglia.  No evidence of acute infarct.  An MRI could not be done

because of the presence of the pacemaker.



Carotid Doppler was done.  The right internal carotid artery was estimated to be

70% obstructed.  No flow was seen in the left internal carotid artery because it

has been known to be totally occluded.



A lengthy conversation was held with the patient.

1.  She was asked to stop smoking.

2.  She was asked to stop taking the Motrin as it seems to be affecting the

kidney.

3.  She was admonished to get her atorvastatin prescription and start taking it.

4.  She was asked to stick to a low cholesterol diet.

5.  She was asked to see me in 2 weeks with her medications so that I know what

she is taking and what she is not.  Blood will be drawn at that time for CBC and

BMP.  If the CBC continues to be abnormal, we will refer her to a hematologist.



Her medications will be:

1.  Aspirin 81 mg a day.

2.  Lisinopril/hydrochlorothiazide 20/12.5 mg a day.

3.  Metoprolol tartrate 50 mg a day.

4.  Percocet half a tablet q.6 hours p.r.n. for headache that does not seem to

be related with tizanidine.  Dr. Mejia did not make any specific

recommendation, so I believe he wanted us to continue with the tizanidine.  I

told her not to take the Motrin.  I did give her Percocet half a tablet every 6

hours as needed for the headaches and not to drive for 4 hours thereafter.  When

she sees the neurologist next, she can talk to him about it.  I know the

neurologistsfrown upon narcotics for headaches.

5.  I believe she is depressed with her circumstances.  She was given Prozac 20

mg a day.

6.  She was asked to continue with the metoprolol tartrate 50 mg 3 times a day.

7.  She was given a prescription for atorvastatin.

8.  She had been taking pantoprazole.  I did not renew that prescription.  If

she shows symptoms of GERD, then we will put her back on it.



She was seen in consultation by Dr. Norman Spaulding.  He writes the carotid

ultrasound was reviewed and progression from her previous carotid ultrasound

from 01/2017, with progression of the right internal carotid stenosis from

50-69% to more than 70% with no changes in the occluded internal carotid artery.

 She does not have any stroke symptoms, no left-sided weakness, visual changes

or speech changes.  She does have right-sided weakness, but stable left old

infarct.  Although, the velocity is elevated  240_/63, the end diastolic is less

than 100 and her ICA/CCA ratio is 2, which is consistent with a stenosis of 70%.

 Especially in the setting of a contralateral occluded ICA, the PSV of the

right internal carotid artery may be higher due to compensation of the occluded

LICA.  A CT of the neck would help clarify the degree of stenosis; however,

would not recommend a CT of the neck with a creatinine at 2.01.  Recommend

repeat carotid ultrasound in 6 months with followup with Vascular Surgery and

continue medical therapy with aspirin, Lipitor and blood pressure control.



This was related to the patient.  However, I plan to have her stop the Motrin

and repeat her renal functions.  It is quite bothersome that she has more than

70% obstruction in the right internal carotid artery with a totally occluded

left internal carotid artery.  If the creatinine is better, I plan to bring her

back in, hydrate her, have a nephrologist see her and proceed with a carotid

arteriogram or a CTA, whichever the radiologist feels is more appropriate. 

Also, at that time, we will have a Hematology consultation.  She is a young 67,

very vivacious woman who has battled several physical odds and even had been

working until recently despite the severe neurological deficits.



FINAL DIAGNOSIS



1. Old CVA with possible extension

2. Severe right internal carotid artery obstruction with total obstruction of 
the contralateral carotid artery.

3. Hypertension

4. Dyslipidemia

5. Chronic kidney disease

6, Severe headaches

7. Probable depression

8. Lymphocytosis





______________________________

GARTH GONSALEZ MD



DR:  ANATOLY/yany  JOB#:  [id number] / [id number]

DD:  03/23/2017 22:49  DT:  03/24/2017 00:36

ANTWON

## 2022-05-17 ENCOUNTER — HOSPITAL ENCOUNTER (EMERGENCY)
Dept: HOSPITAL 61 - ER | Age: 72
Discharge: HOME | End: 2022-05-17
Payer: COMMERCIAL

## 2022-05-17 VITALS — BODY MASS INDEX: 24.61 KG/M2 | WEIGHT: 138.89 LBS | HEIGHT: 63 IN

## 2022-05-17 VITALS — SYSTOLIC BLOOD PRESSURE: 178 MMHG | DIASTOLIC BLOOD PRESSURE: 63 MMHG

## 2022-05-17 DIAGNOSIS — I10: ICD-10-CM

## 2022-05-17 DIAGNOSIS — I25.10: ICD-10-CM

## 2022-05-17 DIAGNOSIS — Z88.2: ICD-10-CM

## 2022-05-17 DIAGNOSIS — Z88.0: ICD-10-CM

## 2022-05-17 DIAGNOSIS — Z88.1: ICD-10-CM

## 2022-05-17 DIAGNOSIS — I25.2: ICD-10-CM

## 2022-05-17 DIAGNOSIS — Z95.1: ICD-10-CM

## 2022-05-17 DIAGNOSIS — L89.612: Primary | ICD-10-CM

## 2022-05-17 DIAGNOSIS — Z88.5: ICD-10-CM

## 2022-05-17 DIAGNOSIS — Z95.5: ICD-10-CM

## 2022-05-17 DIAGNOSIS — F17.200: ICD-10-CM

## 2022-05-17 PROCEDURE — 99284 EMERGENCY DEPT VISIT MOD MDM: CPT

## 2022-05-17 PROCEDURE — 73630 X-RAY EXAM OF FOOT: CPT

## 2022-05-17 NOTE — PHYS DOC
Past Medical History


Past Medical History:  CAD, Hypertension, MI, Stroke


Past Surgical History:  Coronary Bypass Surgery, Splenectomy, Other


Additional Past Surgical Histo:  left foot; cardiac stents


Smoking Status:  Current Every Day Smoker


Drug Use:  None





General Adult


EDM:


Chief Complaint:  WOUND CHECK





HPI:


HPI:





Patient is a 72 year old female who presents with wound to her right heel.  

Patient visited both her primary care doctor as well as her cardiologist today, 

the later of whom suggested she present to the ER to have her wound evaluated.  

Patient rates her pain as mild and states she "sometimes takes tylenol" for it. 

Patient sustained pressure ulcer from a walking boot that she was instructed to 

wear following a calcaneal fracture in August 2021.  Patient denies foul odor, 

purulent drainage, fever, chills, weakness.





Review of Systems:


Review of Systems:


ROS negative or noncontributory except as mentioned in HPI.





Heart Score:


C/O Chest Pain:  No





Current Medications:





Current Medications








 Medications


  (Trade)  Dose


 Ordered  Sig/Janes  Start Time


 Stop Time Status Last Admin


Dose Admin


 


 Acetaminophen


  (Tylenol)  1,000 mg  1X  ONCE  5/17/22 18:00


 5/17/22 18:01   














Allergies:


Allergies:





Allergies








Coded Allergies Type Severity Reaction Last Updated Verified


 


  Penicillins Allergy Intermediate  1/10/17 Yes


 


  Sulfa (Sulfonamide Antibiotics) Allergy Intermediate  1/10/17 Yes


 


  codeine Allergy Intermediate  1/10/17 Yes


 


  erythromycin base Allergy Unknown  3/22/17 Yes











Physical Exam:


PE:





Constitutional: Well developed, well nourished, no acute distress, non-toxic 

appearance.


HENT: Normocephalic, atraumatic, bilateral external ears normal, oropharynx 

moist, no oral exudates, nose normal. 


Eyes: EOMI, conjunctiva normal, no discharge.  


Neck: Normal range of motion, supple, no stridor.  


Skin: See below for pressure ulcer to the right heel.  Skin otherwise warm, dry,

no erythema, no rash. 


Extremities: Right heel tenderness surrounding a 3cm pressure ulcer limited to 

the skin, no cyanosis, no clubbing, ROM intact, no edema, PT pulses 2+ and 

symmetrical. 


Neurologic: Alert and oriented x4, normal motor function, normal sensory 

function, no focal deficits noted.





Current Patient Data:


Vital Signs:





                                   Vital Signs








  Date Time  Temp Pulse Resp B/P (MAP) Pulse Ox O2 Delivery O2 Flow Rate FiO2


 


5/17/22 17:05 98.3 71 16 178/63 (101) 95 Room Air  





 98.3       











Radiology/Procedures:


Radiology/Procedures:


PROCEDURE: FOOT RIGHT 3V





Exam: Right foot 3 views





INDICATION: Pain, heel wound





TECHNIQUE: Frontal, lateral oblique views of the right foot





Comparisons: None





FINDINGS:


Soft tissue swelling overlying the heel with the underlying soft tissue ulcer. 

Bone mineralization is normal. No acute or healed fractures. Joint spaces are 

well-maintained.





IMPRESSION:


Soft tissue ulcer at the heel with surrounding swelling. No underlying osseous 

abnormality is identified.





Electronically signed by: Helen Powell MD (5/17/2022 5:54 PM) [facility]





Course & Med Decision Making:


Course & Med Decision Making


Pertinent Labs and Imaging studies reviewed. (See chart for details)





Patient is a 72-year-old female who presents with a pressure ulcer on her right 

heel.  Patient was put in a walking boot in August last year following a 

calcaneal fracture.  Her cardiologist suggested she present to the emergency 

department today for evaluation of the wound.  Patient states her primary care 

provider suggested wound care.  Plain films did not reveal any bony involvement 

of the ulceration or any evidence of osteomyelitis.  Patient was referred to 

podiatry for further evaluation and management of this wound.  She was started 

on p.o. Keflex.  All of her questions were answered.  Return precautions were 

provided.  Patient understands and is agreeable to discharge plan.





Dragon Disclaimer:


Dragon Disclaimer:


This electronic medical record was generated, in whole or in part, using a voice

recognition dictation system.





Departure


Departure


Impression:  


   Primary Impression:  


   Pressure ulcer of right heel, stage 2


   Additional Impression:  


   Elevated blood pressure reading


Disposition:  01 HOME / SELF CARE / HOMELESS


Condition:  STABLE


Referrals:  


GARTH GONSALEZ MD (PCP)








DIDI QUILES DPM


Patient Instructions:  Pressure Ulcer-Brief





Additional Instructions:  


[facility]


[address]


[address]


[place], [place] [address]


[phone]





EMERGENCY DEPARTMENT GENERAL DISCHARGE INSTRUCTIONS





Thank you for coming to [facility] Emergency Department (ED) 

today and trusting us with you care.  We trust that you had a positive 

experience in our Emergency Department.  If you wish to speak to the department 

management, you may call the director at [phone].





YOUR FOLLOW UP INSTRUCTIONS ARE AS FOLLOWS:


1.  Follow up with your primary care doctor. If you do not have a primary 

doctor, please ask for a resource list of physicians or clinics that may be able

to assist you with follow up care.


2.  The emergency provider has interpreted your imaging studies, if any were 

ordered.  The radiology imaging specialist also reviewed them.  If there is a 

change in the findings, you will be notified in 48 hours when at all possible.


3.  If a lab test or culture has been done, your results will be reviewed and 

you will be notified if you need a change in treatment.


4.  Follow instructions verbalized to you and refer to the printouts if needed.





ADDITIONAL INSTRUCTIONS AND INFORMATION:


1.  Your care today has been supervised by a physician who is specially trained 

in emergency care.  Many problems require more than one evaluation for a 

complete diagnosis and treatment.  We recommend that you schedule your follow up

appointment as recommended to ensure complete treatment of you illness or injur

y.  If you are unable to obtain follow up care and continue to have a problem, 

or if your condition worsens, we recommend that you return to the ED.


2.  We are not able to safely determine your condition over the phone nor are we

able to give sound medical advice over the phone.  For these safety reasons, if 

you call for medical advice we will ask you to come to the ED for further 

evaluation.


3.  If you have any questions regarding these discharge instructions please call

the ED at [phone].





SAFETY INFORMATION:


In the interest of safety, wellness, and injury prevention; we encourage you to 

wear your seat belt, if you smoke; quite smoking, and we encourage family to use

a protective helmet for bicycling and other sporting events that present an 

increased risk for head injury.





IF YOUR SYMPTOMS WORSEN OR NEW SYMPTOMS DEVELOP, OR YOU HAVE CONCERNS ABOUT YOUR

CONDITION; OR IF YOUR CONDITION WORSENS WHILE YOU ARE WAITING FOR YOUR FOLLOW UP

APPOINTMENT; EITHER CONTACT YOUR PRIMARY CARE DOCTOR, THE PHYSICIAN WHOSE NAME 

AND NUMBER YOU WERE GIVEN, OR RETURN TO THE ED IMMEDIATELY.


Scripts


Cephalexin (KEFLEX) 500 Mg Capsule


1 CAP PO BID for 10 Days, #20 CAP


   Prov: ISABELA CESAR         5/17/22











ISABELA CESAR            May 17, 2022 18:00

## 2022-05-17 NOTE — RAD
Exam: Right foot 3 views



INDICATION: Pain, heel wound



TECHNIQUE: Frontal, lateral oblique views of the right foot



Comparisons: None



FINDINGS:

Soft tissue swelling overlying the heel with the underlying soft tissue ulcer. Bone mineralization is
 normal. No acute or healed fractures. Joint spaces are well-maintained.



IMPRESSION:

Soft tissue ulcer at the heel with surrounding swelling. No underlying osseous abnormality is identif
ied.



Electronically signed by: Helen Powell MD (5/17/2022 5:54 PM) [facility]